# Patient Record
Sex: FEMALE | Race: WHITE | NOT HISPANIC OR LATINO
[De-identification: names, ages, dates, MRNs, and addresses within clinical notes are randomized per-mention and may not be internally consistent; named-entity substitution may affect disease eponyms.]

---

## 2017-03-09 PROBLEM — Z00.00 ENCOUNTER FOR PREVENTIVE HEALTH EXAMINATION: Status: ACTIVE | Noted: 2017-03-09

## 2017-04-05 ENCOUNTER — APPOINTMENT (OUTPATIENT)
Dept: CARDIOLOGY | Facility: CLINIC | Age: 77
End: 2017-04-05

## 2017-04-27 ENCOUNTER — APPOINTMENT (OUTPATIENT)
Dept: CARDIOLOGY | Facility: CLINIC | Age: 77
End: 2017-04-27

## 2017-05-03 ENCOUNTER — APPOINTMENT (OUTPATIENT)
Dept: CARDIOLOGY | Facility: CLINIC | Age: 77
End: 2017-05-03

## 2017-11-15 ENCOUNTER — APPOINTMENT (OUTPATIENT)
Dept: CARDIOLOGY | Facility: CLINIC | Age: 77
End: 2017-11-15

## 2018-03-06 ENCOUNTER — RECORD ABSTRACTING (OUTPATIENT)
Age: 78
End: 2018-03-06

## 2018-03-06 ENCOUNTER — MEDICATION RENEWAL (OUTPATIENT)
Age: 78
End: 2018-03-06

## 2018-04-19 ENCOUNTER — RECORD ABSTRACTING (OUTPATIENT)
Age: 78
End: 2018-04-19

## 2018-04-27 ENCOUNTER — NON-APPOINTMENT (OUTPATIENT)
Age: 78
End: 2018-04-27

## 2018-04-27 ENCOUNTER — APPOINTMENT (OUTPATIENT)
Dept: CARDIOLOGY | Facility: CLINIC | Age: 78
End: 2018-04-27
Payer: MEDICARE

## 2018-04-27 VITALS
SYSTOLIC BLOOD PRESSURE: 136 MMHG | DIASTOLIC BLOOD PRESSURE: 64 MMHG | HEART RATE: 72 BPM | WEIGHT: 140 LBS | OXYGEN SATURATION: 99 % | HEIGHT: 62 IN | BODY MASS INDEX: 25.76 KG/M2

## 2018-04-27 DIAGNOSIS — Z86.79 PERSONAL HISTORY OF OTHER DISEASES OF THE CIRCULATORY SYSTEM: ICD-10-CM

## 2018-04-27 DIAGNOSIS — Z87.09 PERSONAL HISTORY OF OTHER DISEASES OF THE RESPIRATORY SYSTEM: ICD-10-CM

## 2018-04-27 DIAGNOSIS — Z87.891 PERSONAL HISTORY OF NICOTINE DEPENDENCE: ICD-10-CM

## 2018-04-27 DIAGNOSIS — Z85.820 PERSONAL HISTORY OF MALIGNANT MELANOMA OF SKIN: ICD-10-CM

## 2018-04-27 DIAGNOSIS — Z85.118 PERSONAL HISTORY OF OTHER MALIGNANT NEOPLASM OF BRONCHUS AND LUNG: ICD-10-CM

## 2018-04-27 DIAGNOSIS — Z87.39 PERSONAL HISTORY OF OTHER DISEASES OF THE MUSCULOSKELETAL SYSTEM AND CONNECTIVE TISSUE: ICD-10-CM

## 2018-04-27 DIAGNOSIS — Z86.39 PERSONAL HISTORY OF OTHER ENDOCRINE, NUTRITIONAL AND METABOLIC DISEASE: ICD-10-CM

## 2018-04-27 PROCEDURE — 99214 OFFICE O/P EST MOD 30 MIN: CPT

## 2018-04-27 PROCEDURE — 93000 ELECTROCARDIOGRAM COMPLETE: CPT

## 2018-07-11 ENCOUNTER — APPOINTMENT (OUTPATIENT)
Dept: CARDIOLOGY | Facility: CLINIC | Age: 78
End: 2018-07-11
Payer: MEDICARE

## 2018-07-11 PROCEDURE — 93306 TTE W/DOPPLER COMPLETE: CPT

## 2018-10-18 ENCOUNTER — MEDICATION RENEWAL (OUTPATIENT)
Age: 78
End: 2018-10-18

## 2018-10-26 ENCOUNTER — APPOINTMENT (OUTPATIENT)
Dept: CARDIOLOGY | Facility: CLINIC | Age: 78
End: 2018-10-26
Payer: MEDICARE

## 2018-10-26 VITALS
DIASTOLIC BLOOD PRESSURE: 54 MMHG | HEIGHT: 62 IN | BODY MASS INDEX: 25.76 KG/M2 | HEART RATE: 70 BPM | SYSTOLIC BLOOD PRESSURE: 98 MMHG | WEIGHT: 140 LBS

## 2018-10-26 PROCEDURE — 99214 OFFICE O/P EST MOD 30 MIN: CPT

## 2019-05-10 ENCOUNTER — NON-APPOINTMENT (OUTPATIENT)
Age: 79
End: 2019-05-10

## 2019-05-10 ENCOUNTER — APPOINTMENT (OUTPATIENT)
Dept: CARDIOLOGY | Facility: CLINIC | Age: 79
End: 2019-05-10
Payer: MEDICARE

## 2019-05-10 VITALS
HEART RATE: 62 BPM | DIASTOLIC BLOOD PRESSURE: 70 MMHG | HEIGHT: 62 IN | BODY MASS INDEX: 26.68 KG/M2 | SYSTOLIC BLOOD PRESSURE: 130 MMHG | WEIGHT: 145 LBS | OXYGEN SATURATION: 100 %

## 2019-05-10 PROCEDURE — 99214 OFFICE O/P EST MOD 30 MIN: CPT

## 2019-05-10 PROCEDURE — 93000 ELECTROCARDIOGRAM COMPLETE: CPT

## 2019-05-10 NOTE — REASON FOR VISIT
[Follow-Up - Clinic] : a clinic follow-up of [Hyperlipidemia] : hyperlipidemia [Hypertension] : hypertension [FreeTextEntry2] : mechanical fall hip fxr repair 8/7/18 [facility], ambulation with cane, CAD MI nonobstructive cath 2009 [FreeTextEntry1] : Aure is a 78-year-old female with history of COPD,  prior tobacco, lung cancer left lobectomy [facility] 10/16/15, melanoma of the arm, hypertension on atenolol, dyslipidemia on simvastatin, severe arthritis, chronic back pain, coronary artery disease, MI in 2009 and nonobstructive catheterization 2009, mechanical fall hip fracture repair 7/11/18, limited ambulation with cane. \par \par Cardiovascular review of symptoms is negative for exertional chest pain,  palpitations, dizziness or syncope. No PND or orthopnea leg edema. No bleeding or black stool. Patient has dyspnea with moderate exertion unchanged. \par \par Patient is walking 10 minutes without exertional chest pain. Arlin does not wish to perform a cardiac stress test. Patient is participating in pulmonary rehab. \par \par Echocardiogram July 2018 LVEF 55%, normal diastolic function, mild MR and TR, mild pulmonary hypertension, PASP 38 mmHg\par \par 2-D echo April 2017, LVEF 55%, mild diastolic dysfunction, trace MR and TR, normal PASP\par \par Carotid and abdominal ultrasound April 2017, nonobstructive normal aortic size\par \par EKG March 18,2015 sinus bradycardia\par \par Stress Echo 2011 revealing normal LVEF with small fixed anterior defect, normal wall motion, likely artifact, nonischemic EKG response, baseline sinus rhythm, PVCs, nonspecific ST-T wave abnormality.\par \par Echocardiogram March 2015, normal LVEF 60%, mild diastolic dysfunction, normal chamber sizes, wall thickness, mild MR, TR. \par

## 2019-05-10 NOTE — REVIEW OF SYSTEMS
[Dyspnea on exertion] : dyspnea during exertion [Joint Pain] : joint pain [Joint Stiffness] : joint stiffness [Negative] : Heme/Lymph

## 2019-05-10 NOTE — PHYSICAL EXAM
[General Appearance - Well Developed] : well developed [Normal Appearance] : normal appearance [Well Groomed] : well groomed [General Appearance - Well Nourished] : well nourished [No Deformities] : no deformities [General Appearance - In No Acute Distress] : no acute distress [Normal Conjunctiva] : the conjunctiva exhibited no abnormalities [Eyelids - No Xanthelasma] : the eyelids demonstrated no xanthelasmas [Normal Oral Mucosa] : normal oral mucosa [No Oral Pallor] : no oral pallor [No Oral Cyanosis] : no oral cyanosis [Normal Jugular Venous A Waves Present] : normal jugular venous A waves present [Normal Jugular Venous V Waves Present] : normal jugular venous V waves present [No Jugular Venous Jay A Waves] : no jugular venous jay A waves [Heart Rate And Rhythm] : heart rate and rhythm were normal [Heart Sounds] : normal S1 and S2 [Murmurs] : no murmurs present [Abdomen Soft] : soft [Abdomen Tenderness] : non-tender [Abdomen Mass (___ Cm)] : no abdominal mass palpated [Nail Clubbing] : no clubbing of the fingernails [Cyanosis, Localized] : no localized cyanosis [Petechial Hemorrhages (___cm)] : no petechial hemorrhages [Skin Color & Pigmentation] : normal skin color and pigmentation [] : no rash [No Venous Stasis] : no venous stasis [No Skin Ulcers] : no skin ulcer [Skin Lesions] : no skin lesions [Oriented To Time, Place, And Person] : oriented to person, place, and time [No Xanthoma] : no  xanthoma was observed [Mood] : the mood was normal [Affect] : the affect was normal [No Anxiety] : not feeling anxious [FreeTextEntry1] : occasional expiratory wheeze

## 2019-05-10 NOTE — ASSESSMENT
[FreeTextEntry1] : Aure is a 78-year-old female with medical history detailed above and active medical issues including:\par \par - No anginal symptoms, declined current stress test , Normal LVEF 55% no significant valvular heart disease, echo July 2018\par \par - Essential (primary) hypertension at BP goal less than 130/80 on atenolol\par \par - Pure hypercholesterolemia on simvastatin well tolerated\par \par - CAD, MI , nonobstructive cath 2009 \par \par - Mechanical fall hip fracture repair 7/11/18, limited ambulation with cane. \par \par - Lung cancer lobectomy Oct 2015 [facility]\par \par - History of vasovagal syncope. Recommended increased oral hydration with electrolyte suppliment drinks, avoid caffeine and alcohol intake.\par \par - Personal history of nicotine dependence. Quit smoking 2015.\par \par - Chronic obstructive lung disease \par \par - Chronic backache \par \par Advised patient to follow active lifestyle with regular cardiovascular exercise. Patient educated on lifestyle and diet modification with low sodium low fat diet and avoidance of excessive alcohol. Patient is aware to call with any symptoms or concerns. \par  \par Patient will be seen in cardiology follow up in 6 months. Patient will have 2-D echocardiogram to assess for  LV systolic function, wall motion and  structural heart disease.\par \par Current cardiac medications remain unchanged. Repeat labs will be ordered with PMD.\par \par Arlin will follow up with Dr. Елена Delatorre for primary care.\par

## 2019-08-27 ENCOUNTER — MEDICATION RENEWAL (OUTPATIENT)
Age: 79
End: 2019-08-27

## 2019-10-16 ENCOUNTER — APPOINTMENT (OUTPATIENT)
Dept: CARDIOLOGY | Facility: CLINIC | Age: 79
End: 2019-10-16
Payer: MEDICARE

## 2019-10-16 PROCEDURE — 93979 VASCULAR STUDY: CPT

## 2019-10-16 PROCEDURE — 93880 EXTRACRANIAL BILAT STUDY: CPT

## 2019-10-16 PROCEDURE — 93306 TTE W/DOPPLER COMPLETE: CPT

## 2021-01-27 ENCOUNTER — RX RENEWAL (OUTPATIENT)
Age: 81
End: 2021-01-27

## 2021-03-12 ENCOUNTER — NON-APPOINTMENT (OUTPATIENT)
Age: 81
End: 2021-03-12

## 2021-03-12 ENCOUNTER — APPOINTMENT (OUTPATIENT)
Dept: CARDIOLOGY | Facility: CLINIC | Age: 81
End: 2021-03-12
Payer: MEDICARE

## 2021-03-12 VITALS
DIASTOLIC BLOOD PRESSURE: 78 MMHG | TEMPERATURE: 98.2 F | SYSTOLIC BLOOD PRESSURE: 122 MMHG | HEIGHT: 61 IN | BODY MASS INDEX: 23.6 KG/M2 | HEART RATE: 72 BPM | WEIGHT: 125 LBS | OXYGEN SATURATION: 98 %

## 2021-03-12 PROCEDURE — 99214 OFFICE O/P EST MOD 30 MIN: CPT

## 2021-03-12 PROCEDURE — 93000 ELECTROCARDIOGRAM COMPLETE: CPT

## 2021-03-12 NOTE — REASON FOR VISIT
[Follow-Up - Clinic] : a clinic follow-up of [Hyperlipidemia] : hyperlipidemia [Hypertension] : hypertension [FreeTextEntry2] : mechanical fall hip fxr repair 8/7/18 [facility], ambulation with walker, CAD MI nonobstructive cath 2009 [FreeTextEntry1] : Aure is a 80-year-old female with history of COPD,  prior tobacco, lung cancer left lobectomy [facility] 10/16/15, melanoma of the arm, hypertension on atenolol, dyslipidemia on simvastatin, severe arthritis, chronic back pain, coronary artery disease, MI in 2009 and nonobstructive catheterization 2009, mechanical fall hip fracture repair 7/11/18, limited ambulation with cane. \par \par Cardiovascular review of symptoms is negative for exertional chest pain,  palpitations, dizziness or syncope. No PND or orthopnea leg edema. No bleeding or black stool. Patient has dyspnea with moderate exertion unchanged. \par \par Patient is walking 10 minutes without exertional chest pain.  Patient declined stress testing in the past.  In the setting of Covid 19 pandemic we will discuss the need for stress testing next office visit.\par \par Echocardiogram Oct 2019, LVEF 55%, mild MR, normal RVSP\par \par Echocardiogram July 2018 LVEF 55%, normal diastolic function, mild MR and TR, mild pulmonary hypertension, PASP 38 mmHg\par \par 2-D echo April 2017, LVEF 55%, mild diastolic dysfunction, trace MR and TR, normal PASP\par \par Carotid and abdominal ultrasound April 2017, nonobstructive normal aortic size\par \par EKG March 18,2015 sinus bradycardia\par \par Stress Echo 2011 revealing normal LVEF with small fixed anterior defect, normal wall motion, likely artifact, nonischemic EKG response, baseline sinus rhythm, PVCs, nonspecific ST-T wave abnormality.\par \par Echocardiogram March 2015, normal LVEF 60%, mild diastolic dysfunction, normal chamber sizes, wall thickness, mild MR, TR. \par

## 2021-03-12 NOTE — PHYSICAL EXAM
[General Appearance - Well Developed] : well developed [Normal Appearance] : normal appearance [Well Groomed] : well groomed [General Appearance - Well Nourished] : well nourished [No Deformities] : no deformities [General Appearance - In No Acute Distress] : no acute distress [Normal Conjunctiva] : the conjunctiva exhibited no abnormalities [Eyelids - No Xanthelasma] : the eyelids demonstrated no xanthelasmas [Normal Oral Mucosa] : normal oral mucosa [No Oral Pallor] : no oral pallor [No Oral Cyanosis] : no oral cyanosis [Normal Jugular Venous A Waves Present] : normal jugular venous A waves present [Normal Jugular Venous V Waves Present] : normal jugular venous V waves present [No Jugular Venous Jay A Waves] : no jugular venous jay A waves [Heart Rate And Rhythm] : heart rate and rhythm were normal [Heart Sounds] : normal S1 and S2 [Murmurs] : no murmurs present [Abdomen Soft] : soft [Abdomen Tenderness] : non-tender [Abdomen Mass (___ Cm)] : no abdominal mass palpated [Nail Clubbing] : no clubbing of the fingernails [Cyanosis, Localized] : no localized cyanosis [Petechial Hemorrhages (___cm)] : no petechial hemorrhages [Skin Color & Pigmentation] : normal skin color and pigmentation [] : no rash [No Venous Stasis] : no venous stasis [Skin Lesions] : no skin lesions [No Skin Ulcers] : no skin ulcer [No Xanthoma] : no  xanthoma was observed [Oriented To Time, Place, And Person] : oriented to person, place, and time [Affect] : the affect was normal [Mood] : the mood was normal [No Anxiety] : not feeling anxious [FreeTextEntry1] : occasional expiratory wheeze

## 2021-03-12 NOTE — ASSESSMENT
[FreeTextEntry1] : Aure is a 80-year-old female with medical history detailed above and active medical issues including:\par \par - No anginal symptoms, declined current stress test , Normal LVEF 55% no significant valvular heart disease, Oct 2019\par \par - Hypertension, home BPs at guideline goal on on atenolol\par \par - Hyperlipidemia on simvastatin well tolerated\par \par - CAD, MI , nonobstructive cath 2009 \par \par - Mechanical fall hip fracture repair 7/11/18, limited ambulation with cane. \par \par - Lung cancer lobectomy Oct 2015 [facility]\par \par - History of vasovagal syncope. Recommended increased oral hydration with electrolyte suppliment drinks, avoid caffeine and alcohol intake.\par \par - Personal history of nicotine dependence. Quit smoking 2015.\par \par - Chronic obstructive lung disease \par \par - Chronic backache \par \par Advised patient to follow active lifestyle with regular cardiovascular exercise. Patient educated on lifestyle and diet modification with low sodium low fat diet and avoidance of excessive alcohol. Patient is aware to call with any symptoms or concerns. \par  \par Patient will have 2-D echocardiogram to assess for  LV systolic function, wall motion and  structural heart disease. Carotid and abdominal ultrasound to assess for obstructive PAD with TEB followup. Patient will be seen in cardiology follow up in 6 months. \par \par Current cardiac medications remain unchanged. Repeat labs will be ordered with PMD.\par \par Arlin will follow up with Dr. Елена Delatorre for primary care.\par

## 2021-03-31 ENCOUNTER — APPOINTMENT (OUTPATIENT)
Dept: CARDIOLOGY | Facility: CLINIC | Age: 81
End: 2021-03-31
Payer: MEDICARE

## 2021-03-31 PROCEDURE — 93880 EXTRACRANIAL BILAT STUDY: CPT

## 2021-03-31 PROCEDURE — 93306 TTE W/DOPPLER COMPLETE: CPT

## 2021-03-31 PROCEDURE — 93979 VASCULAR STUDY: CPT

## 2021-04-12 ENCOUNTER — APPOINTMENT (OUTPATIENT)
Dept: CARDIOLOGY | Facility: CLINIC | Age: 81
End: 2021-04-12
Payer: MEDICARE

## 2021-04-12 PROCEDURE — 99443: CPT | Mod: 95

## 2021-04-12 RX ORDER — ALBUTEROL SULFATE 2.5 MG/3ML
(2.5 MG/3ML) SOLUTION RESPIRATORY (INHALATION)
Qty: 225 | Refills: 0 | Status: ACTIVE | COMMUNITY
Start: 2021-04-07

## 2021-09-10 ENCOUNTER — APPOINTMENT (OUTPATIENT)
Dept: CARDIOLOGY | Facility: CLINIC | Age: 81
End: 2021-09-10
Payer: MEDICARE

## 2021-09-10 VITALS
OXYGEN SATURATION: 100 % | HEIGHT: 62 IN | WEIGHT: 120 LBS | TEMPERATURE: 96.9 F | BODY MASS INDEX: 22.08 KG/M2 | HEART RATE: 63 BPM | DIASTOLIC BLOOD PRESSURE: 60 MMHG | SYSTOLIC BLOOD PRESSURE: 116 MMHG

## 2021-09-10 PROCEDURE — 99214 OFFICE O/P EST MOD 30 MIN: CPT

## 2021-09-10 RX ORDER — NAPROXEN 500 MG/1
500 TABLET ORAL
Refills: 0 | Status: DISCONTINUED | COMMUNITY
End: 2021-09-10

## 2021-09-10 RX ORDER — TIOTROPIUM BROMIDE INHALATION SPRAY 3.12 UG/1
2.5 SPRAY, METERED RESPIRATORY (INHALATION)
Qty: 12 | Refills: 0 | Status: DISCONTINUED | COMMUNITY
Start: 2021-04-08 | End: 2021-09-10

## 2021-09-10 RX ORDER — ALBUTEROL SULFATE 90 UG/1
108 (90 BASE) AEROSOL, METERED RESPIRATORY (INHALATION) AS DIRECTED
Refills: 0 | Status: DISCONTINUED | COMMUNITY
End: 2021-09-10

## 2021-09-10 RX ORDER — ETANERCEPT 25 MG/.5ML
25 SOLUTION SUBCUTANEOUS AS DIRECTED
Refills: 0 | Status: DISCONTINUED | COMMUNITY
End: 2021-09-10

## 2021-09-10 RX ORDER — BUDESONIDE AND FORMOTEROL FUMARATE DIHYDRATE 160; 4.5 UG/1; UG/1
160-4.5 AEROSOL RESPIRATORY (INHALATION) TWICE DAILY
Refills: 0 | Status: DISCONTINUED | COMMUNITY
End: 2021-09-10

## 2021-09-10 NOTE — REVIEW OF SYSTEMS
[Dyspnea on exertion] : dyspnea during exertion [Joint Pain] : joint pain [Joint Stiffness] : joint stiffness [Weakness] : weakness [Negative] : Heme/Lymph [FreeTextEntry9] : Limited ambulation with a walker

## 2021-09-10 NOTE — PHYSICAL EXAM
[General Appearance - Well Developed] : well developed [Normal Appearance] : normal appearance [Well Groomed] : well groomed [General Appearance - Well Nourished] : well nourished [No Deformities] : no deformities [General Appearance - In No Acute Distress] : no acute distress [Eyelids - No Xanthelasma] : the eyelids demonstrated no xanthelasmas [Normal Oral Mucosa] : normal oral mucosa [No Oral Pallor] : no oral pallor [No Oral Cyanosis] : no oral cyanosis [Normal Jugular Venous A Waves Present] : normal jugular venous A waves present [Normal Jugular Venous V Waves Present] : normal jugular venous V waves present [No Jugular Venous Jay A Waves] : no jugular venous jay A waves [Heart Rate And Rhythm] : heart rate and rhythm were normal [Heart Sounds] : normal S1 and S2 [Murmurs] : no murmurs present [Abdomen Soft] : soft [Abdomen Tenderness] : non-tender [Abdomen Mass (___ Cm)] : no abdominal mass palpated [Nail Clubbing] : no clubbing of the fingernails [Cyanosis, Localized] : no localized cyanosis [Petechial Hemorrhages (___cm)] : no petechial hemorrhages [Skin Color & Pigmentation] : normal skin color and pigmentation [] : no rash [No Venous Stasis] : no venous stasis [Skin Lesions] : no skin lesions [No Skin Ulcers] : no skin ulcer [No Xanthoma] : no  xanthoma was observed [Oriented To Time, Place, And Person] : oriented to person, place, and time [Affect] : the affect was normal [Mood] : the mood was normal [No Anxiety] : not feeling anxious [Well Developed] : well developed [Well Nourished] : well nourished [No Acute Distress] : no acute distress [Normal Conjunctiva] : normal conjunctiva [Normal Venous Pressure] : normal venous pressure [No Carotid Bruit] : no carotid bruit [Normal S1, S2] : normal S1, S2 [No Murmur] : no murmur [No Rub] : no rub [No Gallop] : no gallop [Clear Lung Fields] : clear lung fields [Good Air Entry] : good air entry [No Respiratory Distress] : no respiratory distress  [Soft] : abdomen soft [Non Tender] : non-tender [No Masses/organomegaly] : no masses/organomegaly [Normal Bowel Sounds] : normal bowel sounds [No Edema] : no edema [No Cyanosis] : no cyanosis [No Clubbing] : no clubbing [No Varicosities] : no varicosities [No Rash] : no rash [No Skin Lesions] : no skin lesions [Moves all extremities] : moves all extremities [No Focal Deficits] : no focal deficits [Normal Speech] : normal speech [Alert and Oriented] : alert and oriented [Normal memory] : normal memory [de-identified] : Limited ambulation with a walker [FreeTextEntry1] : occasional expiratory wheeze

## 2021-09-10 NOTE — ASSESSMENT
[FreeTextEntry1] : Aure is a 80-year-old female with medical history detailed above and active medical issues including:\par \par - Hypertension, average resting BPs at guideline goal on on atenolol\par \par - Hyperlipidemia on simvastatin well tolerated\par \par - CAD, MI , nonobstructive cath 2009 \par \par - Mechanical fall hip fracture repair 7/11/18, limited ambulation with a walker.\par \par - Limited ambulation, low functional capacity, declines performing a current stress test \par \par - Lung cancer lobectomy Oct 2015 [facility]\par \par - History of vasovagal syncope. Recommended increased oral hydration with electrolyte suppliment drinks, avoid caffeine and alcohol intake.\par \par - Personal history of nicotine dependence. Quit smoking 2015.\par \par - Chronic obstructive lung disease \par \par - Chronic backache \par \par Advised patient to follow active lifestyle with regular cardiovascular exercise. Patient educated on lifestyle and diet modification with low sodium low fat diet and avoidance of excessive alcohol. Patient is aware to call with any symptoms or concerns. \par  \par Patient will be seen in cardiology follow up in 6 months with  2-D echocardiogram to assess LV systolic function, structural heart disease.\par \par Current cardiac medications remain unchanged. Repeat labs will be ordered with PMD.\par \par Arlin will follow up with Dr. Елена Delatorre for primary care.\par

## 2021-09-10 NOTE — REASON FOR VISIT
[Other: ____] : [unfilled] [FreeTextEntry1] : Aure is a 80-year-old female with history of COPD,  prior tobacco, lung cancer left lobectomy [facility] 10/16/15, melanoma of the arm, hypertension on atenolol, dyslipidemia on simvastatin, severe arthritis, chronic back pain, coronary artery disease, MI in 2009 and nonobstructive catheterization 2009, mechanical fall hip fracture repair 7/11/18, limited ambulation with cane. \par \par Cardiovascular review of symptoms is negative for exertional chest pain,  palpitations, dizziness or syncope. No PND or orthopnea leg edema. No bleeding or black stool. Patient has dyspnea with moderate exertion unchanged. \par \par Patient is walking with a walker 10 minutes without exertional chest pain.  Patient declines performing a current stress test.\par \par Echocardiogram March 2021, LVEF 55%, mild MR and TR, mild pHTN\par \par Carotid and abdominal ultrasound March 2021, mild nonobstructive plaque, normal abdominal aortic size.\par \par Echocardiogram Oct 2019, LVEF 55%, mild MR, normal RVSP\par \par Echocardiogram July 2018 LVEF 55%, normal diastolic function, mild MR and TR, mild pulmonary hypertension, PASP 38 mmHg\par \par 2-D echo April 2017, LVEF 55%, mild diastolic dysfunction, trace MR and TR, normal PASP\par \par Carotid and abdominal ultrasound April 2017, nonobstructive normal aortic size\par \par EKG March 18,2015 sinus bradycardia\par \par Stress Echo 2011 revealing normal LVEF with small fixed anterior defect, normal wall motion, likely artifact, nonischemic EKG response, baseline sinus rhythm, PVCs, nonspecific ST-T wave abnormality.\par \par Echocardiogram March 2015, normal LVEF 60%, mild diastolic dysfunction, normal chamber sizes, wall thickness, mild MR, TR. \par

## 2023-01-05 ENCOUNTER — NON-APPOINTMENT (OUTPATIENT)
Age: 83
End: 2023-01-05

## 2023-01-05 ENCOUNTER — APPOINTMENT (OUTPATIENT)
Dept: CARDIOLOGY | Facility: CLINIC | Age: 83
End: 2023-01-05
Payer: MEDICARE

## 2023-01-05 VITALS
DIASTOLIC BLOOD PRESSURE: 62 MMHG | BODY MASS INDEX: 21.35 KG/M2 | OXYGEN SATURATION: 99 % | HEART RATE: 68 BPM | SYSTOLIC BLOOD PRESSURE: 116 MMHG | TEMPERATURE: 96.9 F | HEIGHT: 62 IN | WEIGHT: 116 LBS

## 2023-01-05 PROCEDURE — 99215 OFFICE O/P EST HI 40 MIN: CPT

## 2023-01-05 RX ORDER — BUDESONIDE 0.25 MG/2ML
0.25 INHALANT ORAL
Refills: 0 | Status: COMPLETED | COMMUNITY
End: 2023-01-05

## 2023-01-05 RX ORDER — BUDESONIDE AND FORMOTEROL FUMARATE DIHYDRATE 160; 4.5 UG/1; UG/1
AEROSOL RESPIRATORY (INHALATION)
Refills: 0 | Status: ACTIVE | COMMUNITY

## 2023-01-05 RX ORDER — ETANERCEPT 25 MG/.5ML
25 SOLUTION SUBCUTANEOUS
Refills: 0 | Status: ACTIVE | COMMUNITY

## 2023-01-05 RX ORDER — UMECLIDINIUM BROMIDE AND VILANTEROL TRIFENATATE 62.5; 25 UG/1; UG/1
62.5-25 POWDER RESPIRATORY (INHALATION)
Refills: 0 | Status: ACTIVE | COMMUNITY

## 2023-01-05 RX ORDER — ALBUTEROL 90 MCG
90 AEROSOL (GRAM) INHALATION
Refills: 0 | Status: ACTIVE | COMMUNITY

## 2023-01-05 NOTE — ASSESSMENT
[FreeTextEntry1] : Aure is a 82-year-old female with medical history detailed above and active medical issues including:\par \par - Hypertension, average resting BPs at guideline goal on on atenolol\par \par - Hyperlipidemia on simvastatin well tolerated\par \par - CAD, MI , nonobstructive cath 2009 \par \par - Mechanical fall hip fracture repair 7/11/18, limited ambulation with a walker.\par \par - Limited ambulation, low functional capacity, declines performing a current stress test \par \par - Lung cancer lobectomy Oct 2015 [facility]\par \par - History of vasovagal syncope. Recommended increased oral hydration with electrolyte suppliment drinks, avoid caffeine and alcohol intake.\par \par - Personal history of nicotine dependence. Quit smoking 2015.\par \par - Chronic obstructive lung disease \par \par - Chronic backache \par \par Advised patient to follow active lifestyle with regular cardiovascular exercise. Patient educated on lifestyle and diet modification with low sodium low fat diet and avoidance of excessive alcohol. Patient is aware to call with any symptoms or concerns. \par  \par Patient will be seen in cardiology follow up in 6 months with  dopplers and 2-D echocardiogram to assess LV systolic function, structural heart disease.\par \par Current cardiac medications remain unchanged. Repeat labs will be ordered with PMD.\par \par Arlin will follow up with Dr. Елена Delatorre for primary care.\par

## 2023-01-05 NOTE — PHYSICAL EXAM
[Well Developed] : well developed [Well Nourished] : well nourished [No Acute Distress] : no acute distress [Normal Venous Pressure] : normal venous pressure [No Carotid Bruit] : no carotid bruit [Normal S1, S2] : normal S1, S2 [No Murmur] : no murmur [No Rub] : no rub [No Gallop] : no gallop [Clear Lung Fields] : clear lung fields [Good Air Entry] : good air entry [No Respiratory Distress] : no respiratory distress  [Soft] : abdomen soft [Non Tender] : non-tender [No Masses/organomegaly] : no masses/organomegaly [Normal Bowel Sounds] : normal bowel sounds [No Edema] : no edema [No Cyanosis] : no cyanosis [No Clubbing] : no clubbing [No Varicosities] : no varicosities [No Rash] : no rash [No Skin Lesions] : no skin lesions [Moves all extremities] : moves all extremities [No Focal Deficits] : no focal deficits [Normal Speech] : normal speech [Alert and Oriented] : alert and oriented [Normal memory] : normal memory [General Appearance - Well Developed] : well developed [Normal Appearance] : normal appearance [Well Groomed] : well groomed [General Appearance - Well Nourished] : well nourished [No Deformities] : no deformities [General Appearance - In No Acute Distress] : no acute distress [Normal Conjunctiva] : the conjunctiva exhibited no abnormalities [Eyelids - No Xanthelasma] : the eyelids demonstrated no xanthelasmas [Normal Oral Mucosa] : normal oral mucosa [No Oral Pallor] : no oral pallor [No Oral Cyanosis] : no oral cyanosis [Normal Jugular Venous A Waves Present] : normal jugular venous A waves present [Normal Jugular Venous V Waves Present] : normal jugular venous V waves present [No Jugular Venous Jay A Waves] : no jugular venous jay A waves [Heart Rate And Rhythm] : heart rate and rhythm were normal [Heart Sounds] : normal S1 and S2 [Murmurs] : no murmurs present [Abdomen Soft] : soft [Abdomen Tenderness] : non-tender [Abdomen Mass (___ Cm)] : no abdominal mass palpated [Nail Clubbing] : no clubbing of the fingernails [Cyanosis, Localized] : no localized cyanosis [Petechial Hemorrhages (___cm)] : no petechial hemorrhages [Skin Color & Pigmentation] : normal skin color and pigmentation [] : no rash [No Venous Stasis] : no venous stasis [Skin Lesions] : no skin lesions [No Skin Ulcers] : no skin ulcer [No Xanthoma] : no  xanthoma was observed [Oriented To Time, Place, And Person] : oriented to person, place, and time [Affect] : the affect was normal [Mood] : the mood was normal [No Anxiety] : not feeling anxious [de-identified] : Limited ambulation with a walker [FreeTextEntry1] : occasional expiratory wheeze

## 2023-01-05 NOTE — REASON FOR VISIT
[Other: ____] : [unfilled] [FreeTextEntry1] : Aure is a 82-year-old female with history of COPD,  prior tobacco, lung cancer left lobectomy [facility] 10/16/15, melanoma of the arm, hypertension on atenolol, dyslipidemia on simvastatin, severe arthritis, chronic back pain, coronary artery disease, MI in 2009 and nonobstructive catheterization 2009, mechanical fall hip fracture repair 7/11/18, limited ambulation with cane/walker. \par \par Cardiovascular review of symptoms is negative for exertional chest pain,  palpitations, dizziness or syncope. No PND or orthopnea leg edema. No bleeding or black stool. Patient has dyspnea with moderate exertion unchanged. \par \par Patient is walking with a walker 10 minutes without exertional chest pain.  Patient declines performing a current stress test.\par \par Echocardiogram March 2021, LVEF 55%, mild MR and TR, mild pHTN\par \par Carotid and abdominal ultrasound March 2021, mild nonobstructive plaque, normal abdominal aortic size.\par \par Echocardiogram Oct 2019, LVEF 55%, mild MR, normal RVSP\par \par Echocardiogram July 2018 LVEF 55%, normal diastolic function, mild MR and TR, mild pulmonary hypertension, PASP 38 mmHg\par \par 2-D echo April 2017, LVEF 55%, mild diastolic dysfunction, trace MR and TR, normal PASP\par \par Carotid and abdominal ultrasound April 2017, nonobstructive normal aortic size\par \par EKG March 18,2015 sinus bradycardia\par \par Stress Echo 2011 revealing normal LVEF with small fixed anterior defect, normal wall motion, likely artifact, nonischemic EKG response, baseline sinus rhythm, PVCs, nonspecific ST-T wave abnormality.\par \par Echocardiogram March 2015, normal LVEF 60%, mild diastolic dysfunction, normal chamber sizes, wall thickness, mild MR, TR. \par

## 2023-05-18 NOTE — PHYSICAL EXAM
[Well Developed] : well developed [Well Nourished] : well nourished [No Acute Distress] : no acute distress [Normal Venous Pressure] : normal venous pressure [No Carotid Bruit] : no carotid bruit [Normal S1, S2] : normal S1, S2 [No Murmur] : no murmur [No Rub] : no rub [No Gallop] : no gallop [Clear Lung Fields] : clear lung fields [Good Air Entry] : good air entry [No Respiratory Distress] : no respiratory distress  [Soft] : abdomen soft [Non Tender] : non-tender [No Masses/organomegaly] : no masses/organomegaly [Normal Bowel Sounds] : normal bowel sounds [No Edema] : no edema [No Cyanosis] : no cyanosis [No Clubbing] : no clubbing [No Varicosities] : no varicosities [No Rash] : no rash [No Skin Lesions] : no skin lesions [Moves all extremities] : moves all extremities [No Focal Deficits] : no focal deficits [Normal Speech] : normal speech [Alert and Oriented] : alert and oriented [Normal memory] : normal memory [de-identified] : Limited ambulation with a walker [General Appearance - Well Developed] : well developed [Normal Appearance] : normal appearance [Well Groomed] : well groomed [General Appearance - Well Nourished] : well nourished [No Deformities] : no deformities [General Appearance - In No Acute Distress] : no acute distress [Normal Conjunctiva] : the conjunctiva exhibited no abnormalities [Eyelids - No Xanthelasma] : the eyelids demonstrated no xanthelasmas [Normal Oral Mucosa] : normal oral mucosa [No Oral Pallor] : no oral pallor [No Oral Cyanosis] : no oral cyanosis [Normal Jugular Venous A Waves Present] : normal jugular venous A waves present [Normal Jugular Venous V Waves Present] : normal jugular venous V waves present [No Jugular Venous Jay A Waves] : no jugular venous jay A waves [FreeTextEntry1] : occasional expiratory wheeze [Heart Rate And Rhythm] : heart rate and rhythm were normal [Heart Sounds] : normal S1 and S2 [Murmurs] : no murmurs present [Abdomen Soft] : soft [Abdomen Tenderness] : non-tender [Abdomen Mass (___ Cm)] : no abdominal mass palpated [Nail Clubbing] : no clubbing of the fingernails [Cyanosis, Localized] : no localized cyanosis [Petechial Hemorrhages (___cm)] : no petechial hemorrhages [Skin Color & Pigmentation] : normal skin color and pigmentation [] : no rash [No Venous Stasis] : no venous stasis [Skin Lesions] : no skin lesions [No Skin Ulcers] : no skin ulcer [No Xanthoma] : no  xanthoma was observed [Oriented To Time, Place, And Person] : oriented to person, place, and time [Affect] : the affect was normal [Mood] : the mood was normal [No Anxiety] : not feeling anxious

## 2023-05-25 ENCOUNTER — APPOINTMENT (OUTPATIENT)
Dept: CARDIOLOGY | Facility: CLINIC | Age: 83
End: 2023-05-25

## 2023-07-26 PROBLEM — E78.5 DYSLIPIDEMIA: Status: ACTIVE | Noted: 2018-04-27

## 2023-07-26 PROBLEM — J44.9 CHRONIC OBSTRUCTIVE PULMONARY DISEASE, UNSPECIFIED COPD TYPE: Status: ACTIVE | Noted: 2018-04-27

## 2023-08-01 ENCOUNTER — APPOINTMENT (OUTPATIENT)
Dept: CARDIOLOGY | Facility: CLINIC | Age: 83
End: 2023-08-01
Payer: MEDICARE

## 2023-08-01 ENCOUNTER — RX RENEWAL (OUTPATIENT)
Age: 83
End: 2023-08-01

## 2023-08-01 VITALS
SYSTOLIC BLOOD PRESSURE: 112 MMHG | HEIGHT: 62 IN | DIASTOLIC BLOOD PRESSURE: 50 MMHG | HEART RATE: 71 BPM | OXYGEN SATURATION: 98 % | WEIGHT: 116 LBS | BODY MASS INDEX: 21.35 KG/M2

## 2023-08-01 DIAGNOSIS — E78.5 HYPERLIPIDEMIA, UNSPECIFIED: ICD-10-CM

## 2023-08-01 DIAGNOSIS — J44.9 CHRONIC OBSTRUCTIVE PULMONARY DISEASE, UNSPECIFIED: ICD-10-CM

## 2023-08-01 DIAGNOSIS — R06.09 OTHER FORMS OF DYSPNEA: ICD-10-CM

## 2023-08-01 DIAGNOSIS — I10 ESSENTIAL (PRIMARY) HYPERTENSION: ICD-10-CM

## 2023-08-01 PROCEDURE — 93880 EXTRACRANIAL BILAT STUDY: CPT

## 2023-08-01 PROCEDURE — 93979 VASCULAR STUDY: CPT

## 2023-08-01 PROCEDURE — 93306 TTE W/DOPPLER COMPLETE: CPT

## 2023-08-01 PROCEDURE — 99214 OFFICE O/P EST MOD 30 MIN: CPT

## 2023-08-01 RX ORDER — SIMVASTATIN 20 MG/1
20 TABLET, FILM COATED ORAL DAILY
Qty: 90 | Refills: 1 | Status: ACTIVE | COMMUNITY
Start: 1900-01-01 | End: 1900-01-01

## 2023-08-01 RX ORDER — ATENOLOL 50 MG/1
50 TABLET ORAL DAILY
Qty: 90 | Refills: 1 | Status: ACTIVE | COMMUNITY
Start: 2018-03-06 | End: 1900-01-01

## 2023-08-01 NOTE — PHYSICAL EXAM
[Well Developed] : well developed [Well Nourished] : well nourished [No Acute Distress] : no acute distress [Normal Venous Pressure] : normal venous pressure [No Carotid Bruit] : no carotid bruit [Normal S1, S2] : normal S1, S2 [No Murmur] : no murmur [No Rub] : no rub [No Gallop] : no gallop [Clear Lung Fields] : clear lung fields [Good Air Entry] : good air entry [No Respiratory Distress] : no respiratory distress  [Soft] : abdomen soft [Non Tender] : non-tender [No Masses/organomegaly] : no masses/organomegaly [Normal Bowel Sounds] : normal bowel sounds [No Edema] : no edema [No Cyanosis] : no cyanosis [No Clubbing] : no clubbing [No Varicosities] : no varicosities [No Rash] : no rash [No Skin Lesions] : no skin lesions [Moves all extremities] : moves all extremities [No Focal Deficits] : no focal deficits [Normal Speech] : normal speech [Alert and Oriented] : alert and oriented [Normal memory] : normal memory [General Appearance - Well Developed] : well developed [Normal Appearance] : normal appearance [Well Groomed] : well groomed [General Appearance - Well Nourished] : well nourished [No Deformities] : no deformities [General Appearance - In No Acute Distress] : no acute distress [Normal Conjunctiva] : the conjunctiva exhibited no abnormalities [Eyelids - No Xanthelasma] : the eyelids demonstrated no xanthelasmas [Normal Oral Mucosa] : normal oral mucosa [No Oral Pallor] : no oral pallor [No Oral Cyanosis] : no oral cyanosis [Normal Jugular Venous A Waves Present] : normal jugular venous A waves present [Normal Jugular Venous V Waves Present] : normal jugular venous V waves present [No Jugular Venous Jay A Waves] : no jugular venous jay A waves [Heart Rate And Rhythm] : heart rate and rhythm were normal [Heart Sounds] : normal S1 and S2 [Murmurs] : no murmurs present [Abdomen Soft] : soft [Abdomen Tenderness] : non-tender [Abdomen Mass (___ Cm)] : no abdominal mass palpated [Nail Clubbing] : no clubbing of the fingernails [Cyanosis, Localized] : no localized cyanosis [Petechial Hemorrhages (___cm)] : no petechial hemorrhages [Skin Color & Pigmentation] : normal skin color and pigmentation [] : no rash [No Venous Stasis] : no venous stasis [Skin Lesions] : no skin lesions [No Skin Ulcers] : no skin ulcer [No Xanthoma] : no  xanthoma was observed [Oriented To Time, Place, And Person] : oriented to person, place, and time [Affect] : the affect was normal [Mood] : the mood was normal [No Anxiety] : not feeling anxious [de-identified] : Limited ambulation with a walker [FreeTextEntry1] : occasional expiratory wheeze

## 2023-08-01 NOTE — REASON FOR VISIT
[Other: ____] : [unfilled] [FreeTextEntry1] : Aure is a 82-year-old female with history of COPD,  prior tobacco, lung cancer left lobectomy [facility] 10/16/15, melanoma of the arm, hypertension on atenolol, dyslipidemia on simvastatin, severe arthritis, chronic back pain, coronary artery disease, MI in 2009 and nonobstructive catheterization 2009, mechanical fall hip fracture repair 7/11/18, limited ambulation with cane/walker.   Cardiovascular review of symptoms is negative for exertional chest pain,  palpitations, dizziness or syncope. No PND or orthopnea leg edema. No bleeding or black stool. Patient has dyspnea with moderate exertion unchanged.   Patient is walking with a walker 10 minutes without exertional chest pain.  Patient declines performing a current stress test.  Echocardiogram July 2023 LVEF 60%, mild TR.  Carotid and abdominal ultrasound July 2023, mild nonobstructive plaque, normal abdominal aortic size.  Echocardiogram March 2021, LVEF 55%, mild MR and TR, mild pHTN  Carotid and abdominal ultrasound March 2021, mild nonobstructive plaque, normal abdominal aortic size.  Echocardiogram Oct 2019, LVEF 55%, mild MR, normal RVSP  Echocardiogram July 2018 LVEF 55%, normal diastolic function, mild MR and TR, mild pulmonary hypertension, PASP 38 mmHg  2-D echo April 2017, LVEF 55%, mild diastolic dysfunction, trace MR and TR, normal PASP  Carotid and abdominal ultrasound April 2017, nonobstructive normal aortic size  EKG March 18,2015 sinus bradycardia  Stress Echo 2011 revealing normal LVEF with small fixed anterior defect, normal wall motion, likely artifact, nonischemic EKG response, baseline sinus rhythm, PVCs, nonspecific ST-T wave abnormality.  Echocardiogram March 2015, normal LVEF 60%, mild diastolic dysfunction, normal chamber sizes, wall thickness, mild MR, TR.

## 2023-08-01 NOTE — ASSESSMENT
[FreeTextEntry1] : Aure is a 82-year-old female with medical history detailed above and active medical issues including:  - Hypertension, average resting BPs at guideline goal on on atenolol  - Hyperlipidemia on simvastatin well tolerated  - CAD, MI , nonobstructive cath 2009   - Mechanical fall hip fracture repair 7/11/18, limited ambulation with a walker.  - Limited ambulation, low functional capacity, declines performing a current stress test   - Lung cancer lobectomy Oct 2015 [facility]  - History of vasovagal syncope. Recommended increased oral hydration with electrolyte suppliment drinks, avoid caffeine and alcohol intake.  - Personal history of nicotine dependence. Quit smoking 2015.  - Chronic obstructive lung disease   - Chronic backache   Advised patient to follow active lifestyle with regular cardiovascular exercise. Patient educated on lifestyle and diet modification with low sodium low fat diet and avoidance of excessive alcohol. Patient is aware to call with any symptoms or concerns.    Patient will be seen in cardiology follow up in 6 months.   Current cardiac medications remain unchanged and renewals  are up to date. Repeat labs will be ordered with PMD.  Arlin will follow up with Dr. Fausto Maher for primary care.  Total time spent 45 minutes, reviewing of test results, chart information, patient discussion, physical exam and completion of chart documentation.

## 2024-02-27 ENCOUNTER — APPOINTMENT (OUTPATIENT)
Dept: CARDIOLOGY | Facility: CLINIC | Age: 84
End: 2024-02-27